# Patient Record
Sex: MALE | Race: WHITE | NOT HISPANIC OR LATINO | Employment: PART TIME | URBAN - METROPOLITAN AREA
[De-identification: names, ages, dates, MRNs, and addresses within clinical notes are randomized per-mention and may not be internally consistent; named-entity substitution may affect disease eponyms.]

---

## 2019-01-31 ENCOUNTER — HOSPITAL ENCOUNTER (OUTPATIENT)
Dept: NUTRITION | Facility: HOSPITAL | Age: 24
End: 2019-02-01
Attending: SURGERY | Admitting: SURGERY

## 2019-01-31 LAB
ABS NEUT (OLG): 7.62 X10(3)/MCL (ref 2.1–9.2)
ALBUMIN SERPL-MCNC: 4.4 GM/DL (ref 3.4–5)
ALBUMIN/GLOB SERPL: 1.5 RATIO (ref 1.1–2)
ALP SERPL-CCNC: 107 UNIT/L (ref 50–136)
ALT SERPL-CCNC: 129 UNIT/L (ref 12–78)
APPEARANCE, UA: CLEAR
AST SERPL-CCNC: 46 UNIT/L (ref 15–37)
BACTERIA SPEC CULT: ABNORMAL /HPF
BASOPHILS # BLD AUTO: 0 X10(3)/MCL (ref 0–0.2)
BASOPHILS NFR BLD AUTO: 0 %
BILIRUB SERPL-MCNC: 0.6 MG/DL (ref 0.2–1)
BILIRUB UR QL STRIP: NEGATIVE
BILIRUBIN DIRECT+TOT PNL SERPL-MCNC: 0.1 MG/DL (ref 0–0.5)
BILIRUBIN DIRECT+TOT PNL SERPL-MCNC: 0.5 MG/DL (ref 0–0.8)
BUN SERPL-MCNC: 12 MG/DL (ref 7–18)
CALCIUM SERPL-MCNC: 9.7 MG/DL (ref 8.5–10.1)
CHLORIDE SERPL-SCNC: 103 MMOL/L (ref 98–107)
CO2 SERPL-SCNC: 26 MMOL/L (ref 21–32)
COLOR UR: YELLOW
CREAT SERPL-MCNC: 0.97 MG/DL (ref 0.7–1.3)
EOSINOPHIL # BLD AUTO: 0.1 X10(3)/MCL (ref 0–0.9)
EOSINOPHIL NFR BLD AUTO: 1 %
ERYTHROCYTE [DISTWIDTH] IN BLOOD BY AUTOMATED COUNT: 13.9 % (ref 11.5–17)
GLOBULIN SER-MCNC: 2.9 GM/DL (ref 2.4–3.5)
GLUCOSE (UA): NEGATIVE
GLUCOSE SERPL-MCNC: 98 MG/DL (ref 74–106)
HCT VFR BLD AUTO: 45.4 % (ref 42–52)
HGB BLD-MCNC: 15.8 GM/DL (ref 14–18)
HGB UR QL STRIP: NEGATIVE
KETONES UR QL STRIP: NEGATIVE
LEUKOCYTE ESTERASE UR QL STRIP: NEGATIVE
LYMPHOCYTES # BLD AUTO: 2.7 X10(3)/MCL (ref 0.6–4.6)
LYMPHOCYTES NFR BLD AUTO: 25 %
MCH RBC QN AUTO: 29.5 PG (ref 27–31)
MCHC RBC AUTO-ENTMCNC: 34.8 GM/DL (ref 33–36)
MCV RBC AUTO: 84.9 FL (ref 80–94)
MONOCYTES # BLD AUTO: 0.5 X10(3)/MCL (ref 0.1–1.3)
MONOCYTES NFR BLD AUTO: 5 %
NEUTROPHILS # BLD AUTO: 7.62 X10(3)/MCL (ref 2.1–9.2)
NEUTROPHILS NFR BLD AUTO: 69 %
NITRITE UR QL STRIP: NEGATIVE
PH UR STRIP: 5.5 [PH] (ref 5–9)
PLATELET # BLD AUTO: 291 X10(3)/MCL (ref 130–400)
PMV BLD AUTO: 10.2 FL (ref 9.4–12.4)
POTASSIUM SERPL-SCNC: 4.3 MMOL/L (ref 3.5–5.1)
PROT SERPL-MCNC: 7.3 GM/DL (ref 6.4–8.2)
PROT UR QL STRIP: NEGATIVE
RBC # BLD AUTO: 5.35 X10(6)/MCL (ref 4.7–6.1)
RBC #/AREA URNS HPF: ABNORMAL /[HPF]
SODIUM SERPL-SCNC: 138 MMOL/L (ref 136–145)
SP GR UR STRIP: >1.04 (ref 1–1.03)
SQUAMOUS EPITHELIAL, UA: ABNORMAL
UROBILINOGEN UR STRIP-ACNC: 1
WBC # SPEC AUTO: 11 X10(3)/MCL (ref 4.5–11.5)
WBC #/AREA URNS HPF: ABNORMAL /HPF

## 2022-04-29 NOTE — ED PROVIDER NOTES
Patient:   Clemente Rodriguez             MRN: [id number]            FIN: [id number]               Age:   23 years     Sex:  Male     :  1995   Associated Diagnoses:   None   Author:   Viet Aguirre MD      Addendum      Teaching-Supervisory Addendum-Brief   I participated in the following activities of this patients care: the medical history, the physical exam, medical decision making.   I personally performed: supervision of the patient's care, the medical history, the physical exam, the medical decision making.   The case was discussed with: the nurse practitioner.   Evaluation and management service: I agree with the evaluation and management decisions made in this patient's care.   Results interpretation: I agree with the study interpretation in this patient's care.   Vital signs: Basic Oxygen Information   2019 18:04 CST      SpO2                      97 %                             Oxygen Therapy            Room air    2019 16:32 CST      SpO2                      99 %                             Oxygen Therapy            Room air  .   Notes: Pt seen and examined.  He presents from imaging center for eval of appendicitis, diagnosed on outpatient scan today.  Pt reports onset of pain 2 days ago, gradually worsening.  +N/V, no fever or chills.  Will consult surgery to eval.

## 2022-04-29 NOTE — ED PROVIDER NOTES
Patient:   Clemente Rodriguez             MRN: [id number]            FIN: [id number]               Age:   23 years     Sex:  Male     :  1995   Associated Diagnoses:   Appendicitis   Author:   Candido Johnston      Basic Information   Time seen: Date & time 2019 16:35:00.   History source: Patient.   Arrival mode: Private vehicle.   History limitation: None.      History of Present Illness   The patient presents with abdominal pain and Patient with right lower abdominal pain. states that he had an outpatient CT that was positive for an appendicitis (damien, NP). .  The onset was 3  days ago.  The course/duration of symptoms is fluctuating in intensity.  The character of symptoms is sharp.  The degree at onset was moderate.  The Location of pain at onset was right, lower and abdominal.  The degree at present is moderate.  The Location of pain at present is right, lower and abdominal.  Radiating pain: none. The exacerbating factor is none.  The relieving factor is none.  Therapy today: none.  Risk factors consist of none.  Associated symptoms: nausea.        Review of Systems   Constitutional symptoms:  No fever, no chills.    Respiratory symptoms:  No shortness of breath, no cough.    Cardiovascular symptoms:  No chest pain, no palpitations.    Gastrointestinal symptoms:  Abdominal pain, no vomiting, no diarrhea.    Musculoskeletal symptoms:  No back pain,    Neurologic symptoms:  No altered level of consciousness, no weakness.              Additional review of systems information: All other systems reviewed and otherwise negative.      Health Status   Allergies:    Allergic Reactions (Selected)  No Known Allergies.   Medications:  (Selected)   Prescriptions  Prescribed  baclofen 10 mg oral tablet: 10 mg = 1 tab(s), Oral, TID, PRN PRN as needed for muscle spasm, # 30 tab(s), 0 Refill(s)  indomethacin 25 mg oral capsule: 25 mg = 1 cap(s), Oral, TID, PRN PRN for pain, # 30 cap(s), 0  Refill(s)  Documented Medications  Documented  Adderall: 0 Refill(s), per nurse's notes.   Immunizations: Per nurse's notes.      Past Medical/ Family/ Social History   Medical history: Reviewed as documented in chart.   Surgical history:    No active procedure history items have been selected or recorded., Reviewed as documented in chart.   Family history:    No family history items have been selected or recorded., Reviewed as documented in chart.   Social history: Reviewed as documented in chart.   Problem list: Per nurse's notes.      Physical Examination   General:  Alert, no acute distress, well hydrated, Skin: Normal for ethnicity.    Skin:  Warm, dry, pink, intact.    Head:  Normocephalic.   Neck:  Supple, no tenderness, full range of motion.    Eye:  Pupils are equal, round and reactive to light, extraocular movements are intact, normal conjunctiva.    Cardiovascular:  Regular rate and rhythm, No murmur, Normal peripheral perfusion.    Respiratory:  Lungs are clear to auscultation, breath sounds are equal, Respirations: not tachypneic, not labored, not shallow, Retractions: None.    Chest wall:  No tenderness.   Back:  Normal range of motion, Normal alignment, No costovertebral angle tenderness,    Musculoskeletal:  Normal ROM, normal strength, no swelling, no deformity.    Gastrointestinal:  Soft, Non distended, Tenderness: Moderate, right lower quadrant, Guarding: Negative, Rebound: Negative, Bowel sounds: Normal, Organomegaly: Negative, Trauma: Negative, Mass: Negative, Scars: Negative, Signs: None.    Neurological:  Alert and oriented to person, place, time, and situation, No focal neurological deficit observed, normal sensory observed, normal motor observed, normal speech observed, normal coordination observed, Gait: Normal.    Psychiatric:  Cooperative, appropriate mood & affect, normal judgment.       Medical Decision Making   Documents reviewed:  Emergency department nurses' notes.      Impression  and Plan   Diagnosis   Appendicitis ([id number] K37)      Calls-Consults   -  1/31/2019 17:54:00 , Manish DAILEY, Jeremiah SEGURA, chel Spoke to Dr Neves and will come see patient.    Plan   Disposition: Admit time  1/31/2019 18:50:00, Admit to Surgery.    Counseled: Patient, Family, Regarding diagnosis, Regarding diagnostic results, Regarding treatment plan, Patient indicated understanding of instructions.    Notes: Discussed with Dr Aguirre.

## 2022-05-04 NOTE — HISTORICAL [facility]
This is a historical note converted from Elias. Formatting and pictures may have been removed.  Please reference Elais for original formatting and attached multimedia. Admit and Discharge Dates  Admit Date: 01/31/2019  Discharge Date: 02/01/2019  ?  Physicians  Attending Physician - Manish DAILEY, Jeremiah SEGURA  Admitting Physician - Manish DAILEY, Jeremiah SEGURA  Consulting Physician - Manish DAILEY, Jeremiah SEGURA  Primary Care Physician - No PCP, No  ?  Discharge Diagnosis  Abdominal pain?R10.9  ?  Abdominal pain?[id number]  ?  Appendicitis?K37  ?  Surgical Procedures  01/31/2019 - [id number] - Appendectomy Laparoscopic  ?  Immunizations  No immunizations recorded for this visit.  ?  Admission Information  24 yo M with PMH of GERD, anxiety/depression who presented to the ED 1/31/19 with a 3 day history of right-sided?abdominal pain, vomiting, and anorexia. His CT scan from outside facility demonstrated findings concerning for acute appendicitis without perforation. He was admitted for observation, and underwent laparoscopic appendectomy on 1/31/19. That night he had some nausea and vomiting after IV morphine. His nausea and vomiting resolved with Zofran. The day of discharge he was tolerating a regular diet, his pain was well controlled, and he was ambulating. He was discharged home with pain medication and antiemetics.  Significant Findings  As above  Objective  Vitals & Measurements  T:?36.8? ?C (Oral)? TMIN:?36.4? ?C (Temporal Artery)? TMAX:?36.9? ?C (Oral)? HR:?86(Peripheral)? HR:?69(Monitored)? RR:?12? BP:?119/69? SpO2:?99%? WT:?113.5?kg?  Physical Exam  Gen: Alert, oriented. NAD  CV: RR  Respiratory: Symmetric chest rise, normal WOB  Abdominal: Soft, non-distended, appropriately tender near incision sites. Incisions appear c/d/i  Patient Discharge Condition  Stable  Discharge Disposition  Home   Discharge Medication Reconciliation  Prescribed  acetaminophen-HYDROcodone (Norco 5 mg-325 mg oral  tablet)?1 tab(s), Oral, q6hr, PRN for pain  Continue  amphetamine-dextroamphetamine (Adderall)  baclofen (baclofen 10 mg oral tablet)?10 mg, Oral, TID, PRN as needed for muscle spasm  escitalopram (ESCITALOPRAM 10 MG TABLET)?10 mg, Daily  indomethacin (indomethacin 25 mg oral capsule)?25 mg, Oral, TID, PRN for pain  ondansetron (Zofran 4 mg oral tablet)?4 mg, Oral, q8hr, PRN as needed for nausea/vomiting  ondansetron (Zofran 4 mg oral tablet)?4 mg, Oral, q8hr, PRN as needed for nausea/vomiting  ondansetron (Zofran 4 mg oral tablet)?4 mg, Oral, q8hr, PRN as needed for nausea/vomiting  ondansetron (Zofran 4 mg oral tablet)?4 mg, Oral, q8hr, PRN as needed for nausea/vomiting  ondansetron (Zofran 4 mg oral tablet)?4 mg, Oral, q8hr, PRN as needed for nausea/vomiting  ondansetron (Zofran 4 mg oral tablet)?4 mg, Oral, q8hr, PRN as needed for nausea/vomiting  ?  Education and Orders Provided  Laparoscopic Appendectomy, Adult, Care After, Easy-to-Read  Appendicitis  Stitches, Staples, or Adhesive Wound Closure, Easy-to-Read  Hematoma  Discharge Activity - Activity as Tolerated, No driving while taking pain medication. No heavy lifting, no lifting more than 10 pounds for at least two weeks. May shower, avoid bathing/soaking incisions for at least two weeks?  Discharge Diet - Regular?  Discharge - 02/01/19 11:00:00 CST, Home, After patient tolerates regular breakfast.?  ?  Follow up  Surgical Hospitalist Clinic Appointment, on 02/18/2019  ????Keep scheduled appointment  Report to Emergency Department if symptoms return or worsen  ?      Agree with above

## 2022-05-04 NOTE — HISTORICAL [facility]
This is a historical note converted from [employer]ner. Formatting and pictures may have been removed.  Please reference [employer]ner for original formatting and attached multimedia. Indication for Surgery  Abdominal pain x3 days, vomiting. CT abd/pelvis with concern for acute appendicitis.  Preoperative Diagnosis  Acute appendicitis  Postoperative Diagnosis  same  Operation  Laparoscopic appendectomy  Surgeon(s)  Dr BRI Hammond  Assistant  Prisca Newsome  Anesthesia  General  Estimated Blood Loss  Minimal  Findings  Inflamed appendix  Specimen(s)  Appendix  Complications  None  Technique  After informed consent was obtained, the patient was brought into the operating room and placed on the operating table in the supine position. After induction of general anaesthesia, the abdomen was prepped and draped in the usual sterile fashion. Perioperative Zosyn was administered. A standard time out was carried out, including identification of the correct patient and procedure. Abdomen was entered through a supraumbilical incision, horizontal, allowing the introduction of the Veress needle and inflation of the abdomen. After establishment of pneumoperitoneum, the patient was placed in Trendelenburg position,?and tilted?right side up. A trochar was placed in the umbilical incision, and the laparoscope was introduced. Additional 5 mm and 12 mm?trocars were placed in the lower abdomen suprapubic position and the left lower quadrant, respectively, under direct visualization. An obviously inflamed, edematous appendix was seen, mildly adherent to surrounding tissue. Blunt dissection was used to mobilize and isolate the appendix. The base of the appendix was clearly identified and visualized in relationship to the cecum and terminal ileum. A stapler was passed through the 12 mm trochar, and used across the base of the appendix. The staple lines were examined and were hemostatic. The appendix was then placed in a bag and removed through the 12 mm  left lower quadrant port site with no contact made between contents of the bag and the skin or incision. The right lower quadrant was suctioned and the staple line again inspected for adequate hemostasis.?The trochars were removed, and the skin incisions were anesthetized with a local anesthetic and closed in a subcuticular fashion. Incisions dressed with steri-strips. The patient tolerated the procedure well, awoke from anaesthesia in stable condition, and was brought back to recovery without incident.

## 2022-05-04 NOTE — HISTORICAL [facility]
This is a historical note converted from Elias. Formatting and pictures may have been removed.  Please reference Elias for original formatting and attached multimedia. Chief Complaint  sent by dr young for appendicitis. c/o intermittent RLQ abd pain. afebrile.  History of Present Illness  22 yo M with PMH of GERD and anxiety/depression who presented to the ED with three days of worsening right-sided abdominal pain, vomiting, and anorexia. His pain began in his right side/mid abdominal area. Nothing seemed to make the pain better. It is worsened with movement or laying on his side. Eating does not alter the pain sensation. He denies any fevers/chills or diarrhea. Did have vomiting, vomited three times today which prompted visit to his primary care physician.?PCP recommended CT scan done at a local imaging center, and after the?results the?patient was sent here. He?denies hematemesis. Last PO intake at 11 AM today.  Review of Systems  Denies any headache, vision changes, CP, SOB, rashes/lesions, changes in urinary frequency, urgency, or pain with urination.  Physical Exam  Vitals & Measurements  T:?36.9? ?C (Oral)? HR:?75(Peripheral)? RR:?18? BP:?103/54? SpO2:?97%? WT:?113.5?kg?  General: Alert, oriented, NAD  HENT: NC/AT. EOMI, PERRL, MMM  Neck: Soft  CV: Regular rate, 2+ peripheral pulses  Respiratory: Normal WOB  Abdomen: Obese, soft, non-distended. Tender to palpation in RLQ and RUQ, no rebound tenderness, no guarding  Extremities: Warm, no LE edema  Assessment/Plan  22 yo M with PMH of GERD, anxiety/depression who presents with 3 days of worsening right-sided abdominal pain, vomiting, anorexia and outside CT findings concerning for acute appendicitis.  ?  - Admit to surgical hospitalist service for observation  - OR for laparoscopic appendectomy  - NPO, IVF, Zosyn  - Pain control   Problem List/Past Medical History  Ongoing  Morbid obesity  Historical  No qualifying data  Procedure/Surgical  History  Tonsillectomy, EGD  Medications  Inpatient  Normal Saline (0.9% NS) IV 1,000 mL, 1000 mL, IV  Omeprazole PRN, Lexapro (started 2 weeks ago)  Allergies  No Known Allergies  Social History  Substance Abuse  Never, 07/05/2018  Tobacco  Never (less than 100 in lifetime), N/A, 01/31/2019  Never smoker Use:., 07/05/2018  Drinks alcohol once a month  Family History  Denies any family history of bleeding/clotting disorders  Immunizations  Vaccine Date Status   tetanus/diphtheria/pertussis, acel(Tdap) 09/01/2016 Given   Lab Results  Labs Last 24 Hours?  ?Chemistry?   Sodium Lvl: 138 mmol/L (01/31/19 16:36:00)   Potassium Lvl: 4.3 mmol/L (01/31/19 16:36:00)   Chloride: 103 mmol/L (01/31/19 16:36:00)   CO2: 26 mmol/L (01/31/19 16:36:00)   Calcium Lvl: 9.7 mg/dL (01/31/19 16:36:00)   Glucose Lvl: 98 mg/dL (01/31/19 16:36:00)   BUN: 12 mg/dL (01/31/19 16:36:00)   Creatinine: 0.97 mg/dL (01/31/19 16:36:00)   eGFR-AA: >60 (01/31/19 16:36:00)   eGFR-BASILIO: >60 (01/31/19 16:36:00)   Bili Total: 0.6 mg/dL (01/31/19 16:36:00)   Bili Direct: 0.1 mg/dL (01/31/19 16:36:00)   Bili Indirect: 0.5 mg/dL (01/31/19 16:36:00)   AST:?46 unit/L?High (01/31/19 16:36:00)   ALT:?129 unit/L?High (01/31/19 16:36:00)   Alk Phos: 107 unit/L (01/31/19 16:36:00)   Total Protein: 7.3 gm/dL (01/31/19 16:36:00)   Albumin Lvl: 4.4 gm/dL (01/31/19 16:36:00)   Globulin: 2.9 gm/dL (01/31/19 16:36:00)   A/G Ratio: 1.5 ratio (01/31/19 16:36:00)   ?  ?CBC pending  Diagnostic Results  CT abdomen/pelvis: outside report read findings concerning for acute appendicitis without evidence of perforation      OR for appy  ?  I was present with the resident during the examination on rounds?and agree with assessment as documented.  ???  [ x ] I discussed the case with the resident and agree with the findings and plan as documented in the residents note.  [ ] I discussed the case with the resident and agree with the findings and plan as documented in the residents note  except:

## 2022-12-06 ENCOUNTER — HOSPITAL ENCOUNTER (EMERGENCY)
Facility: HOSPITAL | Age: 27
Discharge: HOME | End: 2022-12-06
Attending: EMERGENCY MEDICINE

## 2022-12-06 VITALS
SYSTOLIC BLOOD PRESSURE: 137 MMHG | OXYGEN SATURATION: 99 % | WEIGHT: 259.06 LBS | RESPIRATION RATE: 18 BRPM | HEIGHT: 69 IN | BODY MASS INDEX: 38.37 KG/M2 | TEMPERATURE: 98 F | HEART RATE: 70 BPM | DIASTOLIC BLOOD PRESSURE: 80 MMHG
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
## 2022-12-06 DIAGNOSIS — S49.91XA INJURY OF RIGHT SHOULDER, INITIAL ENCOUNTER: ICD-10-CM

## 2022-12-06 PROCEDURE — 99284 EMERGENCY DEPT VISIT MOD MDM: CPT

## 2022-12-06 RX ORDER — BACLOFEN 10 MG/1
10 TABLET ORAL 3 TIMES DAILY PRN
Qty: 21 TABLET | Refills: 0 | Status: SHIPPED | OUTPATIENT
Start: 2022-12-06 | End: 2022-12-13

## 2022-12-06 RX ORDER — DICLOFENAC SODIUM 75 MG/1
75 TABLET, DELAYED RELEASE ORAL 2 TIMES DAILY PRN
Qty: 14 TABLET | Refills: 0 | Status: SHIPPED | OUTPATIENT
Start: 2022-12-06 | End: 2022-12-13

## 2022-12-06 NOTE — Clinical Note
"Clemente Celis"Michael was seen and treated in our emergency department on 12/6/2022.  He may return to work on 12/08/2022.  Please excuse 12/5/22-12/7/22     If you have any questions or concerns, please don't hesitate to call.      Jenn MERINO    "
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
## 2024-01-02 ENCOUNTER — HOSPITAL ENCOUNTER (EMERGENCY)
Facility: HOSPITAL | Age: 29
Discharge: HOME | End: 2024-01-02
Attending: STUDENT IN AN ORGANIZED HEALTH CARE EDUCATION/TRAINING PROGRAM

## 2024-01-02 VITALS
OXYGEN SATURATION: 98 % | BODY MASS INDEX: 38.2 KG/M2 | HEIGHT: 69 IN | WEIGHT: 257.94 LBS | HEART RATE: 89 BPM | RESPIRATION RATE: 18 BRPM | SYSTOLIC BLOOD PRESSURE: 164 MMHG | TEMPERATURE: 98 F | DIASTOLIC BLOOD PRESSURE: 98 MMHG

## 2024-01-02 DIAGNOSIS — J06.9 VIRAL URI WITH COUGH: Primary | ICD-10-CM

## 2024-01-02 LAB
FLUAV AG UPPER RESP QL IA.RAPID: NOT DETECTED
FLUBV AG UPPER RESP QL IA.RAPID: NOT DETECTED
RSV A 5' UTR RNA NPH QL NAA+PROBE: NOT DETECTED
SARS-COV-2 RNA RESP QL NAA+PROBE: NOT DETECTED
STREP A PCR (OHS): NOT DETECTED

## 2024-01-02 PROCEDURE — 99284 EMERGENCY DEPT VISIT MOD MDM: CPT

## 2024-01-02 PROCEDURE — 87651 STREP A DNA AMP PROBE: CPT | Performed by: PHYSICIAN ASSISTANT

## 2024-01-02 PROCEDURE — 0241U COVID/RSV/FLU A&B PCR: CPT | Performed by: PHYSICIAN ASSISTANT

## 2024-01-02 RX ORDER — PROMETHAZINE HYDROCHLORIDE AND DEXTROMETHORPHAN HYDROBROMIDE 6.25; 15 MG/5ML; MG/5ML
5 SYRUP ORAL EVERY 6 HOURS PRN
Qty: 100 ML | Refills: 0 | Status: SHIPPED | OUTPATIENT
Start: 2024-01-02 | End: 2024-01-07

## 2024-01-02 RX ORDER — AZELASTINE 1 MG/ML
1 SPRAY, METERED NASAL 2 TIMES DAILY
Qty: 30 ML | Refills: 0 | Status: SHIPPED | OUTPATIENT
Start: 2024-01-02 | End: 2024-01-09

## 2024-01-02 RX ORDER — CETIRIZINE HYDROCHLORIDE 10 MG/1
10 TABLET ORAL DAILY
Qty: 14 TABLET | Refills: 0 | Status: SHIPPED | OUTPATIENT
Start: 2024-01-02 | End: 2024-01-16

## 2024-01-02 RX ORDER — PREDNISONE 10 MG/1
10 TABLET ORAL DAILY
Qty: 5 TABLET | Refills: 0 | Status: SHIPPED | OUTPATIENT
Start: 2024-01-02 | End: 2024-01-07

## 2024-01-03 NOTE — ED PROVIDER NOTES
Encounter Date: 1/2/2024       History     Chief Complaint   Patient presents with    Cough    Sore Throat     Cough with congestion and sore throat (x)3 days. Phyllis scott     28-year-old male presents to the emergency department with complaints of cough, sore throat, nasal congestion, occasional headache x 3 days.  Patient has decreased appetite but still drinking.  He denies fever, vomiting, dysuria shortness of breath, chest pain.      The history is provided by the patient. No  was used.     Review of patient's allergies indicates:  No Known Allergies  No past medical history on file.  No past surgical history on file.  No family history on file.  Social History     Tobacco Use    Smoking status: Former     Types: Cigarettes    Smokeless tobacco: Never   Substance Use Topics    Drug use: Yes     Frequency: 7.0 times per week     Types: Marijuana     Review of Systems   Constitutional:  Negative for chills and fever.   HENT:  Positive for congestion, rhinorrhea and sore throat.    Eyes:  Negative for pain, discharge, redness and itching.   Respiratory:  Positive for cough. Negative for shortness of breath.    Cardiovascular:  Negative for chest pain and palpitations.   Gastrointestinal:  Negative for abdominal pain, nausea and vomiting.   Genitourinary:  Negative for dysuria and flank pain.   Musculoskeletal:  Negative for back pain, myalgias and neck pain.   Skin:  Negative for rash.   Neurological:  Negative for dizziness, light-headedness and headaches.       Physical Exam     Initial Vitals [01/02/24 1702]   BP Pulse Resp Temp SpO2   (!) 164/98 89 18 98.3 °F (36.8 °C) 98 %      MAP       --         Physical Exam    Nursing note and vitals reviewed.  Constitutional: He appears well-developed and well-nourished.   HENT:   Nose: Nose normal.   Mouth/Throat: Oropharynx is clear and moist.   Eyes: Conjunctivae are normal.   Neck: Neck supple.   Normal range of motion.  Cardiovascular:  Normal  rate, regular rhythm, normal heart sounds and intact distal pulses.           Pulmonary/Chest: Breath sounds normal. No respiratory distress. He has no wheezes. He has no rhonchi. He has no rales. He exhibits no tenderness.   Abdominal: Abdomen is soft. Bowel sounds are normal. There is no abdominal tenderness.   Musculoskeletal:         General: Normal range of motion.      Cervical back: Normal range of motion and neck supple.     Lymphadenopathy:     He has no cervical adenopathy.   Neurological: He is alert. GCS eye subscore is 4. GCS verbal subscore is 5. GCS motor subscore is 6.   Skin: Skin is warm. Capillary refill takes less than 2 seconds.         ED Course   Procedures  Labs Reviewed   COVID/RSV/FLU A&B PCR - Normal    Narrative:     The Xpert Xpress SARS-CoV-2/FLU/RSV plus is a rapid, multiplexed real-time PCR test intended for the simultaneous qualitative detection and differentiation of SARS-CoV-2, Influenza A, Influenza B, and respiratory syncytial virus (RSV) viral RNA in either nasopharyngeal swab or nasal swab specimens.         STREP GROUP A BY PCR - Normal    Narrative:     The Xpert Xpress Strep A test is a rapid, qualitative in vitro diagnostic test for the detection of Streptococcus pyogenes (Group A ß-hemolytic Streptococcus, Strep A) in throat swab specimens from patients with signs and symptoms of pharyngitis.            Imaging Results    None          Medications - No data to display  Medical Decision Making  28-year-old male presents to the emergency department with complaints of cough, sore throat, nasal congestion, occasional headache x 3 days.  Patient has decreased appetite but still drinking.  He denies fever, vomiting, dysuria shortness of breath, chest pain.      DDx:  COVID, influenza, RSV, strep pharyngitis, other viral respiratory infection    Prescribed medications to help with symptoms.    Amount and/or Complexity of Data Reviewed  Labs: ordered. Decision-making details  documented in ED Course.    Risk  OTC drugs.  Prescription drug management.               ED Course as of 01/02/24 1832 Tue Jan 02, 2024 1814 Influenza B, Molecular: Not Detected [ER]   1814 RSV Ag by Molecular Method: Not Detected [ER]   1815 SARS-CoV2 (COVID-19) Qualitative PCR: Not Detected [ER]   1815 STREP A PCR ([facility]): Not Detected [ER]      ED Course User Index  [ER] Radha Batista PA                           Clinical Impression:  Final diagnoses:  [J06.9] Viral URI with cough (Primary)          ED Disposition Condition    Discharge Stable          ED Prescriptions       Medication Sig Dispense Start Date End Date Auth. Provider    cetirizine (ZYRTEC) 10 MG tablet Take 1 tablet (10 mg total) by mouth once daily. for 14 days 14 tablet 1/2/2024 1/16/2024 Radha Batista PA    azelastine (ASTELIN) 137 mcg (0.1 %) nasal spray 1 spray (137 mcg total) by Nasal route 2 (two) times daily. for 7 days 30 mL 1/2/2024 1/9/2024 Radha Batista PA    promethazine-dextromethorphan (PROMETHAZINE-DM) 6.25-15 mg/5 mL Syrp Take 5 mLs by mouth every 6 (six) hours as needed (cough). 100 mL 1/2/2024 1/7/2024 Radha Batista PA    predniSONE (DELTASONE) 10 MG tablet Take 1 tablet (10 mg total) by mouth once daily. for 5 days 5 tablet 1/2/2024 1/7/2024 Radha Batista PA          Follow-up Information       Follow up With Specialties Details Why Contact Info    [facility] - Emergency Dept Emergency Medicine  As needed, If symptoms worsen [address] [facility] [id number] [phone]             Radha Batista PA  01/02/24 1832